# Patient Record
Sex: MALE | Race: BLACK OR AFRICAN AMERICAN | NOT HISPANIC OR LATINO | Employment: UNEMPLOYED | URBAN - METROPOLITAN AREA
[De-identification: names, ages, dates, MRNs, and addresses within clinical notes are randomized per-mention and may not be internally consistent; named-entity substitution may affect disease eponyms.]

---

## 2024-09-14 ENCOUNTER — HOSPITAL ENCOUNTER (EMERGENCY)
Facility: HOSPITAL | Age: 39
Discharge: HOME | End: 2024-09-15
Attending: EMERGENCY MEDICINE | Admitting: EMERGENCY MEDICINE
Payer: COMMERCIAL

## 2024-09-14 DIAGNOSIS — J01.00 ACUTE MAXILLARY SINUSITIS, RECURRENCE NOT SPECIFIED: ICD-10-CM

## 2024-09-14 DIAGNOSIS — R51.9 LEFT-SIDED FACE PAIN: ICD-10-CM

## 2024-09-14 LAB
ANION GAP SERPL CALCULATED.3IONS-SCNC: 11 MMOL/L (ref 7–15)
BUN SERPL-MCNC: 13.6 MG/DL (ref 6–20)
CALCIUM SERPL-MCNC: 9.3 MG/DL (ref 8.8–10.4)
CHLORIDE SERPL-SCNC: 104 MMOL/L (ref 98–107)
CREAT SERPL-MCNC: 1.02 MG/DL (ref 0.67–1.17)
EGFRCR SERPLBLD CKD-EPI 2021: >90 ML/MIN/1.73M2
ERYTHROCYTE [DISTWIDTH] IN BLOOD BY AUTOMATED COUNT: 14.5 % (ref 10–15)
ERYTHROCYTE [SEDIMENTATION RATE] IN BLOOD BY WESTERGREN METHOD: 19 MM/HR (ref 0–15)
GLUCOSE SERPL-MCNC: 82 MG/DL (ref 70–99)
HCO3 SERPL-SCNC: 26 MMOL/L (ref 22–29)
HCT VFR BLD AUTO: 42.9 % (ref 40–53)
HGB BLD-MCNC: 13.6 G/DL (ref 13.3–17.7)
MCH RBC QN AUTO: 26.7 PG (ref 26.5–33)
MCHC RBC AUTO-ENTMCNC: 31.7 G/DL (ref 31.5–36.5)
MCV RBC AUTO: 84 FL (ref 78–100)
PLATELET # BLD AUTO: 304 10E3/UL (ref 150–450)
POTASSIUM SERPL-SCNC: 4.4 MMOL/L (ref 3.4–5.3)
RBC # BLD AUTO: 5.09 10E6/UL (ref 4.4–5.9)
SODIUM SERPL-SCNC: 141 MMOL/L (ref 135–145)
WBC # BLD AUTO: 8.8 10E3/UL (ref 4–11)

## 2024-09-14 PROCEDURE — 80048 BASIC METABOLIC PNL TOTAL CA: CPT | Performed by: EMERGENCY MEDICINE

## 2024-09-14 PROCEDURE — 96375 TX/PRO/DX INJ NEW DRUG ADDON: CPT

## 2024-09-14 PROCEDURE — 85652 RBC SED RATE AUTOMATED: CPT | Performed by: EMERGENCY MEDICINE

## 2024-09-14 PROCEDURE — 96361 HYDRATE IV INFUSION ADD-ON: CPT

## 2024-09-14 PROCEDURE — 96374 THER/PROPH/DIAG INJ IV PUSH: CPT

## 2024-09-14 PROCEDURE — 250N000011 HC RX IP 250 OP 636: Performed by: EMERGENCY MEDICINE

## 2024-09-14 PROCEDURE — 85027 COMPLETE CBC AUTOMATED: CPT | Performed by: EMERGENCY MEDICINE

## 2024-09-14 PROCEDURE — 99285 EMERGENCY DEPT VISIT HI MDM: CPT | Mod: 25

## 2024-09-14 PROCEDURE — 36415 COLL VENOUS BLD VENIPUNCTURE: CPT | Performed by: EMERGENCY MEDICINE

## 2024-09-14 PROCEDURE — 258N000003 HC RX IP 258 OP 636: Performed by: EMERGENCY MEDICINE

## 2024-09-14 RX ORDER — DEXAMETHASONE SODIUM PHOSPHATE 4 MG/ML
10 INJECTION, SOLUTION INTRA-ARTICULAR; INTRALESIONAL; INTRAMUSCULAR; INTRAVENOUS; SOFT TISSUE ONCE
Status: COMPLETED | OUTPATIENT
Start: 2024-09-14 | End: 2024-09-14

## 2024-09-14 RX ORDER — KETOROLAC TROMETHAMINE 15 MG/ML
15 INJECTION, SOLUTION INTRAMUSCULAR; INTRAVENOUS ONCE
Status: COMPLETED | OUTPATIENT
Start: 2024-09-14 | End: 2024-09-14

## 2024-09-14 RX ADMIN — DEXAMETHASONE SODIUM PHOSPHATE 10 MG: 4 INJECTION, SOLUTION INTRA-ARTICULAR; INTRALESIONAL; INTRAMUSCULAR; INTRAVENOUS; SOFT TISSUE at 23:20

## 2024-09-14 RX ADMIN — KETOROLAC TROMETHAMINE 15 MG: 15 INJECTION, SOLUTION INTRAMUSCULAR; INTRAVENOUS at 23:19

## 2024-09-14 RX ADMIN — SODIUM CHLORIDE 1000 ML: 9 INJECTION, SOLUTION INTRAVENOUS at 23:32

## 2024-09-14 ASSESSMENT — ACTIVITIES OF DAILY LIVING (ADL): ADLS_ACUITY_SCORE: 33

## 2024-09-14 ASSESSMENT — COLUMBIA-SUICIDE SEVERITY RATING SCALE - C-SSRS
6. HAVE YOU EVER DONE ANYTHING, STARTED TO DO ANYTHING, OR PREPARED TO DO ANYTHING TO END YOUR LIFE?: NO
1. IN THE PAST MONTH, HAVE YOU WISHED YOU WERE DEAD OR WISHED YOU COULD GO TO SLEEP AND NOT WAKE UP?: NO
2. HAVE YOU ACTUALLY HAD ANY THOUGHTS OF KILLING YOURSELF IN THE PAST MONTH?: NO

## 2024-09-15 ENCOUNTER — APPOINTMENT (OUTPATIENT)
Dept: CT IMAGING | Facility: HOSPITAL | Age: 39
End: 2024-09-15
Attending: EMERGENCY MEDICINE
Payer: COMMERCIAL

## 2024-09-15 VITALS
RESPIRATION RATE: 18 BRPM | BODY MASS INDEX: 32.7 KG/M2 | SYSTOLIC BLOOD PRESSURE: 184 MMHG | DIASTOLIC BLOOD PRESSURE: 109 MMHG | HEIGHT: 74 IN | WEIGHT: 254.8 LBS | HEART RATE: 52 BPM | OXYGEN SATURATION: 97 % | TEMPERATURE: 98.1 F

## 2024-09-15 PROCEDURE — 250N000013 HC RX MED GY IP 250 OP 250 PS 637: Performed by: EMERGENCY MEDICINE

## 2024-09-15 PROCEDURE — 70487 CT MAXILLOFACIAL W/DYE: CPT

## 2024-09-15 PROCEDURE — 250N000011 HC RX IP 250 OP 636: Performed by: EMERGENCY MEDICINE

## 2024-09-15 PROCEDURE — 96361 HYDRATE IV INFUSION ADD-ON: CPT

## 2024-09-15 RX ORDER — IOPAMIDOL 755 MG/ML
90 INJECTION, SOLUTION INTRAVASCULAR ONCE
Status: COMPLETED | OUTPATIENT
Start: 2024-09-15 | End: 2024-09-15

## 2024-09-15 RX ORDER — CETIRIZINE HYDROCHLORIDE 10 MG/1
10 TABLET ORAL ONCE
Status: COMPLETED | OUTPATIENT
Start: 2024-09-15 | End: 2024-09-15

## 2024-09-15 RX ORDER — CETIRIZINE HYDROCHLORIDE 10 MG/1
10 TABLET ORAL DAILY
Qty: 30 TABLET | Refills: 0 | Status: SHIPPED | OUTPATIENT
Start: 2024-09-15 | End: 2024-10-15

## 2024-09-15 RX ADMIN — IOPAMIDOL 90 ML: 755 INJECTION, SOLUTION INTRAVENOUS at 01:06

## 2024-09-15 RX ADMIN — CETIRIZINE HYDROCHLORIDE 10 MG: 10 TABLET, FILM COATED ORAL at 01:49

## 2024-09-15 ASSESSMENT — ACTIVITIES OF DAILY LIVING (ADL): ADLS_ACUITY_SCORE: 35

## 2024-09-15 NOTE — ED TRIAGE NOTES
Comes to ED for evaluation of left-sided facial swelling that started yesterday around 1700 hours. Swelling is around left eye and forehead. History of metal plate in left side of face. Denies any trauma or injury. States left vision slightly affected. Vision right eye: 20/25 and left eye: 20/50.      Triage Assessment (Adult)       Row Name 09/14/24 6710          Triage Assessment    Airway WDL WDL        Respiratory WDL    Respiratory WDL WDL        Skin Circulation/Temperature WDL    Skin Circulation/Temperature WDL --  left-sided facial swelling        Cardiac WDL    Cardiac WDL WDL        Peripheral/Neurovascular WDL    Peripheral Neurovascular WDL WDL        Cognitive/Neuro/Behavioral WDL    Cognitive/Neuro/Behavioral WDL WDL

## 2024-09-15 NOTE — ED PROVIDER NOTES
EMERGENCY DEPARTMENT ENCOUNTER      NAME: Lucien Harper  AGE: 39 year old male  YOB: 1985  MRN: [id number]  EVALUATION DATE & TIME: 9/14/2024 10:15 PM    ED PROVIDER: Tammy Diana MD    Chief Complaint   Patient presents with    Facial Swelling       FINAL IMPRESSION  1. Acute maxillary sinusitis, recurrence not specified    2. Left-sided face pain        MEDICAL DECISION MAKING   Lucien Harper is a 39 year old male who presents for evaluation of facial pain and swelling.  Outside records reviewed.  Patient has history of sinusitis, He followed with ENT at that time and underwent ORIF.  He was most recently seen for follow-up on 4/29/2011 hypertension, fistula and LeFort/zygomatic arch fracture in September 20, 2010.  It was noted that his postoperative course was complicated by an episode of sinusitis that was managed with antibiotics.  He presents today with complaints of left-sided facial swelling.  Patient was seen in the ED at [facility] complaints of left eyebrow swelling and pain.  At that time, there was no concern for preseptal cellulitis, retrobulbar hematoma, or other infectious/traumatic process.  Patient was advised to continue conservative management with Tylenol/Motrin and also given a prescription for meloxicam.     Patient presents today with complaints of left-sided facial pain and swelling.  He reports that this began yesterday and have been progressively worsening.  He localizes discomfort around his eye with some extension into the frontal bones and maxillary area as well as back towards temporal scalp.  He also notes that his face seems swollen and eyelid seems a bit droopy.  He has not had any pain with movement of his eye and also denies measured fever, photophobia, vision change, tinnitus, fall/trauma.  He notes that he was seen at [facility] for similar symptoms in the past and was advised that he needs to follow-up but never received a phone call so was never seen.  He does  have a history of hypertension but reports that he does not take his blood pressure medication every day.  He tried taking some Excedrin for his current symptoms yesterday evening but had no improvement.    On exam, patient did have mild edema and tenderness palpation over frontal and maxillary sinuses, temporal area. Also with mild, but appreciable eyelid drooping.  Extraocular movements are intact without evidence of entrapment.  Conjunctiva clear.      Consider broad differential included but not limited to cellulitis, abscess, deep space infection, preseptal or septal cellulitis, atypical migraine, cluster headache, tension headache, disruption of previously placed hardware, GCA, other.  Low suspicion for intracranial hemorrhage, fracture, meningitis, encephalitis.  Discussed options for workup and management with patient and his significant other.  We have agreed on plan for basic labs, CT of facial bones, and management of symptoms with IV fluids and IV analgesic/antiemetics as well as steroids    CT showed evidence of maxillary sinusitis but no evidence of deep space infection, hemorrhage, or other acute process to explain symptoms.  I rechecked the patient and reviewed results with he and his significant other.  He had resolution of symptoms after initial interventions and has been able to tolerate p.o.  We discussed potential etiology of his pain and my concern that sinusitis may be contributing. Given his symptoms have only been ongoing for the past couple days, have low suspicion for bacterial infection and patient was comfortable with plan for continued conservative management of symptoms with Tylenol/Motrin.  I also recommended that he start taking Zyrtec on a daily basis given his difficulty with allergies.  He states that he is also planning to get back on his antihypertensive which I encouraged him to do.  Will give first dose of Zyrtec here.    At the end of the encounter, we reviewed the results,  potential diagnoses, as well as return precautions and recommendations for follow up. I instructed Mr. Harper to return to the emergency department immediately if he develops any new or worsening symptoms and provided additional verbal discharge instructions. Mr. Harper expressed understanding and agreement with this plan of care, his questions were answered, and he was discharged in stable condition.      Considerations in Medical Decision Making  History:  Supplemental history from: Documented in chart  External Record(s) reviewed: Documented in chart    Work Up:  Chart documentation includes differential considered and any EKGs or imaging independently interpreted by provider, where specified.  In additional to work up documented, I considered the following work up: Documented in chart, if applicable.    External consultation:  Discussion of management with another provider: Documented in chart, if applicable    Complicating factors:  Care impacted by chronic illness: Hypertension  Care affected by social determinants of health: Access to Medical Care    Disposition considerations: Discharge. I prescribed additional prescription strength medication(s) as charted. I considered admission, but discharged patient after significant clinical improvement.    Sinusitis:MDM for ABX: NO Antibiotics Prescribed: symptoms were < 11 days     ED COURSE  10:53 PM I met with the patient to obtain patient history and performed a physical exam. Discussed plan for ED work up including potential diagnostic studies and interventions.  12:50 AM I rechecked the patient      MEDICATIONS GIVEN IN THE ED  Medications   sodium chloride 0.9% BOLUS 1,000 mL (0 mLs Intravenous Stopped 9/15/24 0126)   ketorolac (TORADOL) injection 15 mg (15 mg Intravenous $Given 9/14/24 2319)   dexAMETHasone (DECADRON) injection 10 mg (10 mg Intravenous $Given 9/14/24 2320)   iopamidol (ISOVUE-370) solution 90 mL (90 mLs Intravenous $Given 9/15/24 0106)  "  cetirizine (zyrTEC) tablet 10 mg (10 mg Oral $Given 9/15/24 0149)       NEW PRESCRIPTIONS STARTED AT TODAY'S VISIT  Discharge Medication List as of 9/15/2024  1:52 AM        START taking these medications    Details   cetirizine (ZYRTEC) 10 MG tablet Take 1 tablet (10 mg) by mouth daily., Disp-30 tablet, R-0, Local Print                =================================================================    HPI:    Patient information was obtained from: Patient    Lucien Harper is a 39 year old male who presents with facial pain and swelling. Patient reports left-sided facial pain and swelling that started yesterday that has since worsened and developed a migraine. He took Excedrin with no relief. Patient broke his face and had a plate inserted on his left cheek. He states his face felt hot along with the plate prior to arrival. 6 months ago, patient went to [place] and had a ultrasound done where they found pockets of blood. Patient never followed up with the provider at [place] and is concerned of a blood clot. Patient confirms droopy eyes, swollen eyes, and blinking a lot. Patient denies sore throat, known fever, blurry vision, double vision, ear pain, trauma, fall, or injury.       RELEVANT HISTORY, MEDICATIONS, & ALLERGIES   Past medical history, surgical history, family history, medications, and allergies reviewed and pertinent noted in HPI.    REVIEW OF SYSTEMS:  A complete review of systems was performed with pertinent positives and negatives noted in the HPI. All other systems negative.     PHYSICAL EXAM:    Vitals: BP (!) 184/109   Pulse 52   Temp 98.1  F (36.7  C) (Oral)   Resp 18   Ht 1.88 m (6' 2\")   Wt 115.6 kg (254 lb 12.8 oz)   SpO2 97%   BMI 32.71 kg/m     General: Alert and interactive, comfortable appearing.  HENT: Atraumatic. Full AROM of neck. MMM.  Mild edema and tenderness palpation over frontal and maxillary sinuses, temporal area. Also with mild, but appreciable eyelid drooping.  " Extraocular movements are intact without evidence of entrapment.  Conjunctiva clear.  No nuchal rigidity.  Cardiovascular: Regular rate and rhythm.   Chest/Pulmonary: Normal work of breathing. Speaking in complete sentences. Lungs CTAB. No chest wall tenderness or deformities.  Abdomen: Soft, nondistended. Nontender without guarding or rebound.  Extremities: Normal AROM of all major joints.  Skin: Warm and dry. Normal skin color.   Neuro: Speech clear. CNs grossly intact. Moves all extremities spontaneously.   Psych: Normal affect/mood, cooperative, memory appropriate.      LAB  Labs Ordered and Resulted from Time of ED Arrival to Time of ED Departure   ERYTHROCYTE SEDIMENTATION RATE AUTO - Abnormal       Result Value    Erythrocyte Sedimentation Rate 19 (*)    BASIC METABOLIC PANEL - Normal    Sodium 141      Potassium 4.4      Chloride 104      Carbon Dioxide (CO2) 26      Anion Gap 11      Urea Nitrogen 13.6      Creatinine 1.02      GFR Estimate >90      Calcium 9.3      Glucose 82     CBC WITH PLATELETS - Normal    WBC Count 8.8      RBC Count 5.09      Hemoglobin 13.6      Hematocrit 42.9      MCV 84      MCH 26.7      MCHC 31.7      RDW 14.5      Platelet Count 304         RADIOLOGY  CT Facial Bones with Contrast   Final Result   IMPRESSION:    1.  Left facial ORIF without evidence for complication.   2.  No soft tissue abnormality by CT.   3.  Moderate diffuse maxillary sinus mucosal thickening, correlate for sinusitis.            I, Keila Yu, am serving as a scribe to document services personally performed by Dr. Tammy Diana based on my observation and the provider's statements to me. I, Tammy Diana MD attest that Keila Yu is acting in a scribe capacity, has observed my performance of the services and has documented them in accordance with my direction.    Tammy Diana M.D.  Emergency Medicine  [facility] EMERGENCY DEPARTMENT  [id number]  [facility] [id number]  [phone]  Dept: [phone]     Tammy Diana MD  09/15/24 0441

## 2024-09-15 NOTE — DISCHARGE INSTRUCTIONS
You were seen in the Emergency Department today for a headache and facial pain.    Like we talked about, your CT scan showed that you have some inflammation of your sinuses and I think this is at least contributing to your symptoms.  Since you do get allergies, I would recommend that you start taking the Zyrtec every day in case this is contributing to your symptoms.  It might also help with the secretions.  You can use Tylenol and ibuprofen for pain.  If you start to spike a fever or your symptoms do not get better, then I would be more worried that you have a bacterial infection that will require antibiotics.      Please return to the ER if you experience fever, uncontrolled pain, vision change or loss, trouble breathing or swallowing, and/or for any other new or concerning symptoms, otherwise please follow up with your primary doctor in 5-7 days for recheck.     Below is some information you might find useful.     Thank you for choosing [facility]. It was a pleasure taking care of you today!  - Dr. Tammy Diana      7

## 2025-06-26 ENCOUNTER — HOSPITAL ENCOUNTER (EMERGENCY)
Facility: HOSPITAL | Age: 40
Discharge: HOME | End: 2025-06-26
Payer: COMMERCIAL

## 2025-06-26 VITALS
WEIGHT: 235 LBS | RESPIRATION RATE: 18 BRPM | HEART RATE: 89 BPM | HEIGHT: 74 IN | SYSTOLIC BLOOD PRESSURE: 180 MMHG | DIASTOLIC BLOOD PRESSURE: 109 MMHG | TEMPERATURE: 98.1 F | BODY MASS INDEX: 30.16 KG/M2 | OXYGEN SATURATION: 96 %

## 2025-06-26 DIAGNOSIS — R36.9 PENILE DISCHARGE: ICD-10-CM

## 2025-06-26 DIAGNOSIS — Z20.2 STD EXPOSURE: ICD-10-CM

## 2025-06-26 LAB
ALBUMIN UR-MCNC: NEGATIVE MG/DL
APPEARANCE UR: CLEAR
BILIRUB UR QL STRIP: NEGATIVE
COLOR UR AUTO: ABNORMAL
GLUCOSE UR STRIP-MCNC: NEGATIVE MG/DL
HGB UR QL STRIP: NEGATIVE
KETONES UR STRIP-MCNC: NEGATIVE MG/DL
LEUKOCYTE ESTERASE UR QL STRIP: NEGATIVE
MUCOUS THREADS #/AREA URNS LPF: PRESENT /LPF
NITRATE UR QL: NEGATIVE
PH UR STRIP: 5.5 [PH] (ref 5–7)
RBC URINE: 0 /HPF
SP GR UR STRIP: 1.02 (ref 1–1.03)
UROBILINOGEN UR STRIP-MCNC: NORMAL MG/DL
WBC URINE: 0 /HPF

## 2025-06-26 PROCEDURE — 250N000009 HC RX 250

## 2025-06-26 PROCEDURE — 99284 EMERGENCY DEPT VISIT MOD MDM: CPT | Mod: 25

## 2025-06-26 PROCEDURE — 81001 URINALYSIS AUTO W/SCOPE: CPT

## 2025-06-26 PROCEDURE — 250N000011 HC RX IP 250 OP 636

## 2025-06-26 PROCEDURE — 96372 THER/PROPH/DIAG INJ SC/IM: CPT

## 2025-06-26 PROCEDURE — 87491 CHLMYD TRACH DNA AMP PROBE: CPT

## 2025-06-26 RX ORDER — DOXYCYCLINE 100 MG/1
100 CAPSULE ORAL 2 TIMES DAILY
Qty: 14 CAPSULE | Refills: 0 | Status: SHIPPED | OUTPATIENT
Start: 2025-06-26 | End: 2025-07-03

## 2025-06-26 RX ADMIN — LIDOCAINE HYDROCHLORIDE 500 MG: 10 INJECTION, SOLUTION INFILTRATION; PERINEURAL at 19:02

## 2025-06-26 ASSESSMENT — ACTIVITIES OF DAILY LIVING (ADL)
ADLS_ACUITY_SCORE: 41
ADLS_ACUITY_SCORE: 41

## 2025-06-26 NOTE — ED TRIAGE NOTES
Pt arrives to triage ambulatory endorses approx 0300 this morning noticed creamy white discharge from penis, only sexual contact in few months since Monday had sex with a new partner w/ unknown STD status. Denies pain, urinary frequency or urgency.

## 2025-06-26 NOTE — DISCHARGE INSTRUCTIONS
Take the antibiotics as prescribed twice daily for the next 7 days ago for potential treatment of an STI.  You were tested for gonorrhea and chlamydia here and if neither of these are positive someone will call you within the next few days, however, you are already on the antibiotics to treat this.  If you have any acute worsening concerns return here for evaluation.  Otherwise follow-up with your primary care doctor as needed.    No intercourse until you are done with antibiotics.  Let your recent partners know that they should be tested and treated as well.

## 2025-06-26 NOTE — ED PROVIDER NOTES
Emergency Department Encounter   NAME: Lucien Harper ; AGE: 40 year old male ; YOB: 1985 ; MRN: [id number] ; PCP: No Ref-Primary, Physician   ED PROVIDER: Bonita Murray PA-C    Evaluation Date & Time:   No admission date for patient encounter.    CHIEF COMPLAINT:  Penile Discharge      Impression and Plan   MDM: Lucien Harper is a 40 year old male who presents to the ED for evaluation of penile discharge since early this morning.  Unprotected sex with new partner 3 days ago.  Unsure if there are STI or STD status.  No urinary symptoms.  No fevers.  No rashes, swelling, pain to penis or testicles.   Patient comfortable appearing in no acute distress.  Hypertensive at 181/104 although patient stating he forgot to take his blood pressure medications last few days.  Afebrile.  exam reveals uncircumcised penis without any paraphimosis/phimosis, cellulitis, sxs consistent with necrotizing fasciitis, testicular tenderness to suggest torsion.     UA without signs of infection.  Patient would like to be prophylactically treated for gonorrhea and chlamydia which I think is reasonable considering symptoms and history.  Will give him a dose of Rocephin here and discharged with doxycycline I did offer him HIV and syphilis testing as well however he declines this.  States this was his new partner for the first time in months.  He did get a phone call to this partner and let them know to be treated as well.  I discussed to refrain from intercourse until done with treatment.  If he has any acute worsening symptoms come back here otherwise take the antibiotics as prescribed.    Patient expressed understanding and is discharged in stable condition.     Medical Decision Making      Discharge. I prescribed additional prescription strength medication(s) as charted. See documentation for any additional details.    MIPS (CTPE, Dental pain, Elizondo, Sinusitis, Asthma/COPD, Head Trauma): Not Applicable  SEPSIS:  "None  Critical Care: 0    ED COURSE:  5:47 PM I met and introduced myself to the patient. I gathered initial history and performed my physical exam. We discussed plan for initial workup.   I rechecked the patient and discussed results, discharge, follow up, and reasons to return to the ED.     At the conclusion of the encounter I discussed the results of all the tests and the disposition. The questions were answered. The patient or family acknowledged understanding and was agreeable with the care plan.    FINAL IMPRESSION:    ICD-10-CM    1. STD exposure  Z20.2       2. Penile discharge  R36.9             MEDICATIONS GIVEN IN THE EMERGENCY DEPARTMENT:  Medications   cefTRIAXone (ROCEPHIN) 500 mg in lidocaine injection (500 mg Intramuscular $Given 6/26/25 1902)         NEW PRESCRIPTIONS STARTED AT TODAY'S ED VISIT:  Discharge Medication List as of 6/26/2025  7:35 PM        START taking these medications    Details   doxycycline hyclate (VIBRAMYCIN) 100 MG capsule Take 1 capsule (100 mg) by mouth 2 times daily for 7 days., Disp-14 capsule, R-0, E-Prescribe               HPI   Use of Intrepreter: N/A     Lucien Harper is a 40 year old male with no pertinent history who presents to the ED by car for evaluation of penile discharge.     Per chart review, patient was seen at Orthopedics at [facility] on 03/25/25 for right shoulder pain. Impression states: Normal joint spaces and alignment. No fracture.     Patient reports \"creamy white\" discharge from the penis approximately ~3 am this morning. He had unprotected sex with a new partner on Monday (06/23/25). Patient denies any redness or swelling around the scrotum or testicles. No history of STI/STDs. Patient thought he had STD 6 months ago and had it checked out. He was told he didn't have it. No fever, but reports sweating at night due to the heat. No Iv drug use. Denies any urinary symptoms or abdominal pain. No other symptoms at this time. " "    Medical History     History reviewed. No pertinent past medical history.    Past Surgical History:   Procedure Laterality Date    FACIAL FRACTURE SURGERY Left        No family history on file.         doxycycline hyclate (VIBRAMYCIN) 100 MG capsule  oxyCODONE-acetaminophen (PERCOCET) 5-325 mg per tablet          Physical Exam     First Vitals:  Patient Vitals for the past 24 hrs:   BP Temp Temp src Pulse Resp SpO2 Height Weight   06/26/25 1936 (!) 180/109 -- -- -- -- -- -- --   06/26/25 1742 (!) 181/104 98.1  F (36.7  C) Oral 89 18 96 % 1.88 m (6' 2\") 106.6 kg (235 lb)       PHYSICAL EXAM:   Physical Exam    Constitutional: alert,  no acute distress,  not ill-appearing  Head: normocephalic, atraumatic  Eyes: EOM intact   Neck: ROM normal  Cardio: regular rate  Pulmonary: effort normal   Abdominal: flat, no distention  :   - uncircumcised male, no penile rashes/tenderness/crepitus  - no testicular torsion or abnormal lie   MSK: no obvious swelling or deformity  Skin: no visible rashes or erythema   Neuro: no gross focal deficit, acting per baseline   Psychiatric: mood and behavior within normal limit    Results     LAB:  All pertinent labs reviewed and interpreted  Labs Ordered and Resulted from Time of ED Arrival to Time of ED Departure   ROUTINE UA WITH MICROSCOPIC REFLEX TO CULTURE - Abnormal       Result Value    Color Urine Light Yellow      Appearance Urine Clear      Glucose Urine Negative      Bilirubin Urine Negative      Ketones Urine Negative      Specific Gravity Urine 1.021      Blood Urine Negative      pH Urine 5.5      Protein Albumin Urine Negative      Urobilinogen Urine Normal      Nitrite Urine Negative      Leukocyte Esterase Urine Negative      Mucus Urine Present (*)     RBC Urine 0      WBC Urine 0     CHLAMYDIA TRACHOMATIS/NEISSERIA GONORRHOEAE BY PCR        RADIOLOGY:  No orders to display     PROCEDURES:  None     I,Let Let, am serving as a scribe to document services personally " performed by Bonita Murary PA-C, based on my observation and the provider's statements to me. I, Bonita Murray PA-C attest that Let Sona is acting in a scribe capacity, has observed my performance of the services and has documented them in accordance with my direction.     Bonita Murray PA-C   Emergency Medicine   [facility] EMERGENCY DEPARTMENT       Bonita Murray PA-C  06/27/25 0004

## 2025-06-27 LAB
C TRACH DNA SPEC QL PROBE+SIG AMP: NEGATIVE
N GONORRHOEA DNA SPEC QL NAA+PROBE: POSITIVE
SPECIMEN TYPE: ABNORMAL